# Patient Record
Sex: FEMALE | Race: AMERICAN INDIAN OR ALASKA NATIVE
[De-identification: names, ages, dates, MRNs, and addresses within clinical notes are randomized per-mention and may not be internally consistent; named-entity substitution may affect disease eponyms.]

---

## 2018-01-26 ENCOUNTER — HOSPITAL ENCOUNTER (EMERGENCY)
Dept: HOSPITAL 5 - ED | Age: 43
Discharge: HOME | End: 2018-01-26
Payer: COMMERCIAL

## 2018-01-26 VITALS — SYSTOLIC BLOOD PRESSURE: 136 MMHG | DIASTOLIC BLOOD PRESSURE: 77 MMHG

## 2018-01-26 DIAGNOSIS — M25.512: ICD-10-CM

## 2018-01-26 DIAGNOSIS — M54.6: ICD-10-CM

## 2018-01-26 DIAGNOSIS — M54.2: Primary | ICD-10-CM

## 2018-01-26 DIAGNOSIS — M54.5: ICD-10-CM

## 2018-01-26 DIAGNOSIS — M79.602: ICD-10-CM

## 2018-01-26 DIAGNOSIS — M79.605: ICD-10-CM

## 2018-01-26 PROCEDURE — 72125 CT NECK SPINE W/O DYE: CPT

## 2018-01-26 PROCEDURE — 72128 CT CHEST SPINE W/O DYE: CPT

## 2018-01-26 PROCEDURE — 72131 CT LUMBAR SPINE W/O DYE: CPT

## 2018-01-26 PROCEDURE — 70450 CT HEAD/BRAIN W/O DYE: CPT

## 2018-01-26 NOTE — XRAY REPORT
Left hip 2 views:



History: Pain.

Findings:



No bony or articular abnormality. No fracture dislocation or soft 

tissue calcification. This



Impression:



Essentially negative left hip.
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
## 2018-01-26 NOTE — XRAY REPORT
Left shoulder 3 views:



Findings:



Mild arthritic changes a.c. joint and glenohumeral joint. No fracture 

dislocation or soft tissue calcification. Next



Impression:





Mild arthritic changes a.c. joint and glenohumeral joint.

## 2018-01-26 NOTE — XRAY REPORT
Left elbow 2 views:



History: Pain.



Findings:



No bony or articular abnormality. No fracture dislocation or soft 

tissue calcification.



Impression:



Essentially negative left elbow.
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
